# Patient Record
Sex: MALE | Race: WHITE | NOT HISPANIC OR LATINO
[De-identification: names, ages, dates, MRNs, and addresses within clinical notes are randomized per-mention and may not be internally consistent; named-entity substitution may affect disease eponyms.]

---

## 2016-12-14 NOTE — ED PROVIDER NOTE - ATTENDING CONTRIBUTION TO CARE
70yo male hx of CAD s/p CABG,ESRD on HD, chronic lymphedema, DM, HTN, HLD BIB EMS for cough x 1 week, productive w yellow/green sputum. +SOB w cough, pt had xray done in dialysis center showed L pleural effusion. Patent also reports chronic lymphedema b/l le and being treated with oral abx. On exam, Patient is awake,alert,oriented x 3. Patient's chest w decreased BS on Left, irregular +s1s2. Abdomen is soft nd/nt +BS. B/L LE with erythema, 1-2 + edema.  Patient noted to be in afib, has hx of it. Pt noted to be hypoxic placed on NRB, 4LNC at 90.   Check labs, Xray chest, B/L duplex, no hx dvt/pe. renal consult. possible pneumonia and cellulitis. re eval after labs.

## 2016-12-14 NOTE — ED ADULT NURSE NOTE - OBJECTIVE STATEMENT
Pt  BIBA for cough Pt productive cough with yellow mucous.  No fever Pt also is Dialysis pt Left av graft T th Sat Pt has chronic Lymphoedema and has scabbed wounds and an open wound lower left shin.  Pt states waiting for a wound specialists.  Pt uses a walker and has been having difficulty past couple of days Cornelio

## 2016-12-14 NOTE — ED PROVIDER NOTE - CONSTITUTIONAL, MLM
normal... Well appearing, well nourished, awake, alert, oriented to person, place, time/situation and in no apparent distress.

## 2016-12-14 NOTE — ED ADULT NURSE NOTE - PMH
AF (atrial fibrillation)    Anasarca    CAD (Coronary Artery Disease)    Cataract    CHF (congestive heart failure)    Chronic Kidney Disease (CKD)    Diabetes Mellitus Type II    Foot ulcer due to secondary DM  rt foot nacrotic foot ulcer  History of Prostate Cancer    Hyperlipemia    Hypertension    Moderate aortic stenosis    Pancreatitis    TA (Temporal Arteritis)

## 2016-12-14 NOTE — ED ADULT NURSE NOTE - PSH
AVF (Arteriovenous Fistula)    Coronary Stent  x 3 in 2008  History of Prostatectomy    Postpancreatectomy Hyperglycemia    S/P Cholecystectomy

## 2016-12-14 NOTE — ED PROVIDER NOTE - OBJECTIVE STATEMENT
72 YO M w/ Hx of CAD s/p CABG, Hx of HD, chronic lymphedema, DM, HTN, HLD p/w cough and bilateral LE infeciton. Pt is a poor historian, states he was initially sent to ED for worsening bilateral LE cellulitis and failed outpt Abx. Pt states his legs became more painful, felt his legs "give out" and sat on the toilet at home, EMS came to help pt. EMS states pt has been having worsening cough and congestion, sent to ED to r/o PNA.

## 2016-12-14 NOTE — ED ADULT NURSE REASSESSMENT NOTE - NS ED NURSE REASSESS COMMENT FT1
no distress noted, on NC with 6L, tolerating well, on CM with Afib. pending bed assignment, admitted to tele service.

## 2016-12-14 NOTE — ED PROVIDER NOTE - MEDICAL DECISION MAKING DETAILS
70 YO M p/w possible cough, weakness, possible PNA and possible worsening LE cellulitis but no signs of infection, unclear story for weakness, will contact wife, labs, ECG, CXR, UA, cardiac enzymes, and reassess.

## 2016-12-14 NOTE — ED PROVIDER NOTE - DIAGNOSIS COUNSELING, MDM
conducted a detailed discussion... I had a detailed discussion with the patient and/or guardian regarding the historical points, exam findings, and any diagnostic results supporting the discharge/admit diagnosis.

## 2016-12-14 NOTE — ED ADULT NURSE NOTE - NEURO WDL
Alert and oriented to person, place and time, memory intact, behavior appropriate to situation, PERRL.

## 2016-12-14 NOTE — ED PROVIDER NOTE - NS ED ATTENDING STATEMENT MOD
I have personally seen and examined this patient. I have fully participated in the care of this patient. I have reviewed all pertinent clinical information, including history physical exam, plan and the Resident's note and agree except as noted

## 2016-12-15 NOTE — H&P ADULT. - PROBLEM SELECTOR PLAN 4
Patient with Atrial fibrillation hx  -continue with Eliquis for a/c  -Continue with coreg  -continue to monitor on telemetry Patient with ESRD on HD  -nephrology consult appreciated: HD today  -continue with sevelamer, aranesp with HD

## 2016-12-15 NOTE — H&P ADULT. - PROBLEM SELECTOR PLAN 2
Patient with bilateral LE swelling and pain on exam, likely 2/2 volume overload  -DVT b/l LE show no evidence of DVT  -less likely concern for cellulitis on exam with L. sided erythrema present on exam, but no warmth, leukocytosis, fever,   -wound care consult (Pt of Dr. Brandyn Cotton's outpatient)

## 2016-12-15 NOTE — H&P ADULT. - PROBLEM SELECTOR PLAN 8
Patient with hx of prolonged 8 month hospitalization with cause of pancreatitis 2/2 stone (per patient)   -continue with Creon Patient reports humalog 6TID and no lantus at this time  -Pt with weight of about 100kg -> will do 10U lantus qAM and humalog 3U TID  -A1c ordered for AM  -CC, renal diet  -f/u FSG, ISS

## 2016-12-15 NOTE — H&P ADULT. - EKG AND INTERPRETATION
Personally reviewed & interpreted:  EKG: Atrial fibrillation with HR of 102bpm, right axis deviation, TWI vs biphasic waves seen in inferior leads (seen in 4/2016 EKG)

## 2016-12-15 NOTE — H&P ADULT. - EXTREMITIES COMMENTS
pt with venous stasis changes on bilateral LE, with erythrema present on LLE, with +1-2 edema bilaterally

## 2016-12-15 NOTE — H&P ADULT. - PROBLEM SELECTOR PLAN 1
Patient with volume overload on exam, found to have L. sided pleural effusion and edema bilaterally LLE on exam, likely causing dyspnea and hypoxia  -Nephrology consult appreciated: Pt to go for HD today  -continue with supplemental O2 as needed. After HD, will reassess patient, and at that time if continued hypoxia and dyspnea, may consider CT chest to r/o underlying PNA  -r/o ACS: no new EKG changes, f/u serial CE, continue with monitoring on telemetry Patient with volume overload on exam, found to have L. sided pleural effusion and edema bilaterally LLE on exam, likely causing dyspnea and hypoxia  -Nephrology consult appreciated: Pt to go for HD today  -CT chest to evaluate, r/o underlying PNA. Treat for HCAP at this time with vanc and zosyn  -continue with supplemental O2 as needed  -r/o ACS: no new EKG changes, f/u serial CE, continue with monitoring on telemetry

## 2016-12-15 NOTE — H&P ADULT. - PROBLEM SELECTOR PLAN 9
DVT ppx: Eliquis Patient with hx of prolonged 8 month hospitalization with cause of pancreatitis 2/2 stone (per patient)   -continue with Creon

## 2016-12-15 NOTE — H&P ADULT. - NEGATIVE NEUROLOGICAL SYMPTOMS
no paresthesias/no weakness/no syncope/no focal seizures/no tremors/no generalized seizures/no transient paralysis

## 2016-12-15 NOTE — H&P ADULT. - LAB RESULTS AND INTERPRETATION
Personally reviewed & interpreted:  CBC: No leukocytosis noted, Hg at 11.8 (baseline around 11)  ESR of 23 with CRP of 1.79  Cardiac enzymes: Troponin of 0.36 with elevated CKMB  with wnl CK  RVP negative

## 2016-12-15 NOTE — H&P ADULT. - NEGATIVE MUSCULOSKELETAL SYMPTOMS
no arthritis/no muscle weakness/no joint swelling/no stiffness/no myalgia/no arthralgia/no muscle cramps

## 2016-12-15 NOTE — H&P ADULT. - RADIOLOGY RESULTS AND INTERPRETATION
Personally reviewed & interpreted:  CXR: Pleural effusion to mid lung on L. side  DVT LE: No DVT noted

## 2016-12-15 NOTE — PROVIDER CONTACT NOTE (CHANGE IN STATUS NOTIFICATION) - ACTION/TREATMENT ORDERED:
Reassess BP in 15 minutes via ausculation MD will consider fluid bolus if pt remains hypotensive. RN will monitor

## 2016-12-15 NOTE — H&P ADULT. - ATTENDING COMMENTS
agree with excellent PGY-2 note with the following additions:  This is a 71 year old gentleman with hx of CAD s/p CABG, ESRD on HD (TThS), AFib on eloquis, insulin dependent type 2 DM, aortic and MV replacement,  CVA with L sided weaknes, HTN, HLD, chronic venous stasis presenting with cough, worsening lower extremity pain, redness. Regarding cough, he notes worsening cough x 1 week productive of brown sputum.  He denies SOB, has stable 2 pillow orthopnea, legs slightly more edema than previously, does not recall how much volume was taken off at last HD. Regarding legs, acute on chronic worsening with pus expressed this AM, has previously been treated with oral ABx. Denies fever, chills.  In the ED, -120/70s, HR of 80s, afebrile, initially hypoxic to 80s on RA, 90% on 4L, 100% on NRB. On exam, decreased breath sounds LLL, no crackles, bilateral 1-2+ non-pitting edema with L shin erythema and area of broken skin, no clear fluid collections, non-purulent. Labs notable for no leukocytosis, troponin of 0.36, RVP negative.  CXR with LLL effusion, no focal consolidation. Unclear etiology of LLL effusion, looks large on CXR. DDx includes CHF vs. parapneumonic effusion. Should get thora by pulm for symptoms relief and diagnostic value.  Will continue treatment for HAP (patient on HD) with vanc/zosyn/azithro for now pending thora. Legs look like stasis dermatitis, though vanc will also treat positive gram positive cellulitis. HD tomorrow, continue continuous pulse oximeter. agree with excellent PGY-2 note with the following additions:  This is a 71 year old gentleman with hx of CAD s/p CABG, ESRD on HD (TThS), AFib on eloquis, insulin dependent type 2 DM, aortic and MV replacement,  CVA with L sided weaknes, HTN, HLD, chronic venous stasis presenting with cough, worsening lower extremity pain, redness. Regarding cough, he notes worsening cough x 1 week productive of brown sputum.  He denies SOB, has stable 2 pillow orthopnea, legs slightly more edema than previously, does not recall how much volume was taken off at last HD. Regarding legs, acute on chronic worsening with pus expressed this AM, has previously been treated with oral ABx. Denies fever, chills.  In the ED, -120/70s, HR of 80s, afebrile, initially hypoxic to 80s on RA, 90% on 4L, 100% on NRB. On exam, decreased breath sounds LLL, no crackles, bilateral 1+ non-pitting edema with erythema, dry skin, healing scab on L anterior shin and abraded skin on anterior L ankle.  Legs are symmetric, cool to touch, no clear fluid collections, non-purulent. Labs notable for no leukocytosis, troponin of 0.36, RVP negative.  CXR with LLL effusion, no focal consolidation. Unclear etiology of LLL effusion, looks large on CXR. DDx includes CHF vs. parapneumonic effusion. Should get thora by pulm for symptoms relief and diagnostic value.  Will continue treatment for HAP (patient on HD) with vanc/zosyn/azithro for now pending thora. Legs look like stasis dermatitis, though vanc will also treat positive gram positive cellulitis. HD tomorrow, continue continuous pulse oximeter.

## 2016-12-15 NOTE — H&P ADULT. - PROBLEM SELECTOR PLAN 7
Patient reports humalog 6TID and no lantus at this time  -Pt with weight of about 100kg -> will do 10U lantus qAM and humalog 3U TID  -A1c ordered for AM  -CC, renal diet  -f/u FSG, ISS Pt with hx of CABG  -r/o ACS with workup as mentioned above  -continue ASA, coreg, unclear why pt not on statin (will need to f/u with PMD)

## 2016-12-15 NOTE — H&P ADULT. - NEGATIVE OPHTHALMOLOGIC SYMPTOMS
no blurred vision L/no lacrimation R/no lacrimation L/no discharge R/no photophobia/no discharge L/no pain L/no blurred vision R/no diplopia

## 2016-12-15 NOTE — H&P ADULT. - PROBLEM SELECTOR PLAN 5
Currently BP controlled  -continue with coreg Patient with Atrial fibrillation hx  -continue with Eliquis for a/c  -Continue with coreg  -continue to monitor on telemetry Patient with Atrial fibrillation hx  -continue with Eliquis for a/c - may need better a/c in setting of ESRD  -Continue with coreg  -continue to monitor on telemetry

## 2016-12-15 NOTE — H&P ADULT. - NEGATIVE GENERAL SYMPTOMS
no polyphagia/no polydipsia/no fever/no malaise/no polyuria/no chills/no sweating/no weight loss/no anorexia

## 2016-12-15 NOTE — H&P ADULT. - ASSESSMENT
70 yo M with hx of CAD s/p CABG, ESRD on HD, Atrial fibrillation, DM2, aortic and MV replacement, hx of CVA (~2014) with residual L. arm & leg weakness, HTN, HLD, presents with worsening L. LE swelling and pain with also worsening cough, found to have L. pleural effusion.

## 2016-12-15 NOTE — H&P ADULT. - HISTORY OF PRESENT ILLNESS
70 yo M with hx of CAD s/p CABG, ESRD on HD, DM2, HTN, HLD, presents with worsening LE swelling and pain. Patient reports today he felt very painful legs and felt leg swelling. He reports the leg swelling and redness has been present over the past six months, but today it was worse. He felt it had popped and white had come out. Patient had been on oral medications but he is unclear what the name of the antibiHe called his PMD, who told him to go the ED. Patient said he also come to the ED because of worsening cough over the past week. He reports a brownish sputum that has been coming up. He says he has no fever, chills, SOB, pleuritic pain, 72 yo M with hx of CAD s/p CABG, ESRD on HD, Atrial fibrillation, DM2, aortic and MV replacement, hx of CVA (~2014) with residual L. arm & leg weakness, HTN, HLD, presents with worsening L. LE swelling and pain. Patient reports today he felt very painful legs and felt leg swelling. He reports the leg swelling and redness has been present over the past six months, but today he felt the left side was worse. He felt something had popped on the L. side and white had come out. Patient had been on oral medications but he is unclear what the name of the antibiotics. He called his PMD, who told him to go the ED. Patient said he also come to the ED because of worsening cough over the past week. He reports a brownish sputum that has been coming up. He says he has no fever, chills, SOB, pleuritic pain.  Patient reports he lives at home with his wife and child. His wife mostly takes care of him, with an aide helping to help bathe him.     Patient in the ED, VS: BP: 118/72, HR of 82, T of 97.8F. Patient was found to be hypoxic with 90% on 4L NC, and placed on % O2. Patient given ceftriaxone and azithromycin.

## 2016-12-15 NOTE — H&P ADULT. - PROBLEM SELECTOR PLAN 3
Patient with ESRD on HD  -nephrology consult appreciated: HD today  -continue with sevelamer, aranesp with HD Patient with pleural effusion likely causing dyspnea  -F/u imaging after HD  -CT chest in the AM to evaluate to r/o PNA  -May consider thoracentesis as unclear why patient with one sided pleural effusion

## 2016-12-15 NOTE — H&P ADULT. - NEGATIVE GASTROINTESTINAL SYMPTOMS
no abdominal pain/no vomiting/no nausea/no change in bowel habits/no constipation/no flatulence/no diarrhea

## 2016-12-15 NOTE — H&P ADULT. - PROBLEM SELECTOR PLAN 6
Pt with hx of CABG  -r/o ACS with workup as mentioned above  -continue ASA, coreg, unclear why pt not on statin (will need to f/u with PMD) Currently BP controlled  -continue with coreg

## 2016-12-16 NOTE — PROVIDER CONTACT NOTE (OTHER) - ASSESSMENT
Pt coughed repeatedly while eating accompanied with mucous production.  02 sat remained wnl with supplemental oxygenation (2liters)

## 2016-12-21 NOTE — PHYSICAL THERAPY INITIAL EVALUATION ADULT - ADDITIONAL COMMENTS
contd from above: He reports the leg swelling and redness has been present over the past six months, but today he felt the left side was worse. He felt something had popped on the L. side and white had come out. Patient had been on oral medications but he is unclear what the name of the antibiotics. He called his PMD, who told him to go the ED. Patient said he also come to the ED because of worsening cough over the past week. He reports a brownish sputum that has been coming up. He says he has no fever, chills, SOB, pleuritic pain. Ct head: old infarct; Ct chest: mod L pleural effusion with complete collapse L lower lobr partial collapse left upper lobe    social history: pt lives with wife in private house, no stairs to negotiate, pt has HHA 3 days/week to assist with bathing. wife assists with ADLs, pt able to ambulate short household distances with rollator

## 2016-12-21 NOTE — PHYSICAL THERAPY INITIAL EVALUATION ADULT - ACTIVE RANGE OF MOTION EXAMINATION, REHAB EVAL
Right UE Active ROM was WFL (within functional limits)/B LE WFL AAROM; left shouldr NT pt declines due to pain

## 2016-12-21 NOTE — PHYSICAL THERAPY INITIAL EVALUATION ADULT - PERTINENT HX OF CURRENT PROBLEM, REHAB EVAL
70 yo M with hx of CAD s/p CABG, ESRD on HD, Atrial fibrillation, DM2, aortic and MV replacement, hx of CVA (~2014) with residual L. arm & leg weakness, HTN, HLD, presents with worsening L. LE swelling and pain. Patient reports today he felt very painful legs and felt leg swelling. contd below:

## 2016-12-21 NOTE — PHYSICAL THERAPY INITIAL EVALUATION ADULT - CRITERIA FOR SKILLED THERAPEUTIC INTERVENTIONS
anticipated equipment needs at discharge/therapy frequency/impairments found/anticipated discharge recommendation/predicted duration of therapy intervention

## 2016-12-22 NOTE — DIETITIAN INITIAL EVALUATION ADULT. - PROBLEM SELECTOR PLAN 4
Patient with ESRD on HD  -nephrology consult appreciated: HD today  -continue with sevelamer, aranesp with HD

## 2016-12-22 NOTE — DIETITIAN INITIAL EVALUATION ADULT. - OTHER INFO
Consult for nutrition assessment. Pt admitted for worsening LE swelling and pain. Per chart, Pt with acute respiratory failure w/ hypercapnea 2/2 volume overload and acute on chronic diastolic HF. Pt w/ Type 2 DM, h/o CAD s/p CABG, ESRD on HD. Pt reports good appetite in house- estimates consuming ~% at meals (consistent with flow sheet and RN). Pt reports UBW of 216 pounds- note current weight from 12/21 of 219 pounds. Pt reports checking finger sticks 2-3x/day at home with normal values between , sometimes ~150 towards the end of the day. No complaints of GI distress- Pt reports 3 BMs yesterday. No difficulty chewing or swallowing. No known food allergies confirmed. Consult for nutrition assessment. Pt admitted for worsening LE swelling and pain. Per chart, Pt with acute respiratory failure w/ hypercapnea 2/2 volume overload and acute on chronic diastolic HF. Pt w/ Type 2 DM, h/o CAD s/p CABG, ESRD on HD. Pt reports good appetite in house- estimates consuming ~% at meals (consistent with flow sheet and RN). Pt reports UBW of 216 pounds- note current weight from 12/21 of 219 pounds. Pt states he checks weights regularly at home, consistent at 216 pounds. Pt reports checking finger sticks 2-3x/day at home with normal values between , sometimes ~150 towards the end of the day. No complaints of GI distress- Pt reports 3 BMs yesterday. No difficulty chewing or swallowing. No known food allergies confirmed.

## 2016-12-22 NOTE — DIETITIAN INITIAL EVALUATION ADULT. - PROBLEM SELECTOR PLAN 3
Patient with pleural effusion likely causing dyspnea  -F/u imaging after HD  -CT chest in the AM to evaluate to r/o PNA  -May consider thoracentesis as unclear why patient with one sided pleural effusion

## 2016-12-22 NOTE — DIETITIAN INITIAL EVALUATION ADULT. - PROBLEM SELECTOR PLAN 7
Pt with hx of CABG  -r/o ACS with workup as mentioned above  -continue ASA, coreg, unclear why pt not on statin (will need to f/u with PMD)

## 2016-12-22 NOTE — DIETITIAN INITIAL EVALUATION ADULT. - ADHERENCE
fair/Pt follows a strict fluid restriction and watches his carbohydrate intake. Pt's wife states they try to monitor his potassium and sodium intake but was unfamiliar of sources of these nutrients. Typical intake: breakfast- oatmeal; lunch- turkey sandwich; dinner- chicken cutlet, turkey meatballs w/ pasta. Pt sips on ice cubes throughout the day and sometimes has tea.

## 2016-12-22 NOTE — DIETITIAN INITIAL EVALUATION ADULT. - PROBLEM SELECTOR PLAN 2
Patient with bilateral LE swelling and pain on exam, likely 2/2 volume overload  -DVT b/l LE show no evidence of DVT  -less likely concern for cellulitis on exam with L. sided erythrema present on exam, but no warmth, leukocytosis, fever,   -wound care consult (Pt of Dr. Barndyn Cotton's outpatient)

## 2016-12-22 NOTE — DIETITIAN INITIAL EVALUATION ADULT. - ORAL INTAKE PTA
Pt reports good appetite and intake PTA. Pt states he takes a vitamin for his diabetes but could not recall the name./good

## 2016-12-22 NOTE — DIETITIAN INITIAL EVALUATION ADULT. - PROBLEM SELECTOR PLAN 9
Patient with hx of prolonged 8 month hospitalization with cause of pancreatitis 2/2 stone (per patient)   -continue with Creon

## 2016-12-22 NOTE — DIETITIAN INITIAL EVALUATION ADULT. - NS AS NUTRI INTERV MEALS SNACK
1. Continue current diet order 2. Monitor PO intake, GI distress, weight, labs, and skin integrity 3. Provide food preferences within diet restrictions as feasible

## 2016-12-22 NOTE — DIETITIAN INITIAL EVALUATION ADULT. - DIET TYPE
consistent carbohydrate renal with no snacks/1000ml/DASH/TLC (sodium and cholesterol restricted diet)

## 2016-12-22 NOTE — DIETITIAN INITIAL EVALUATION ADULT. - NS AS NUTRI INTERV ED CONTENT
1. Provided diet education regarding renal restrictions. Discussed sources of potassium, phosphorus, and sodium in the diet and tips to monitor intake. 2. Discussed sodium and fluid restrictions & strategies to avoid excessive intake. 3. Provided CKD Stage 5 Tips for People on Dialysis & CKD Stage 5 Nutrition Therapy handouts from the [employer]. Pt & Pt's wife at bedside amenable to education and able to verbalize understanding, stating they will work on making these changes.

## 2016-12-22 NOTE — DIETITIAN INITIAL EVALUATION ADULT. - PERTINENT LABORATORY DATA
12/22 Na 134, BUN 52, Cr 5.82, Glucose 136, Phosphorus 7; 12/16 HgbA1c 6.6%. Finger sticks: 12/22: 120, 12/21:

## 2016-12-22 NOTE — DIETITIAN INITIAL EVALUATION ADULT. - ENERGY NEEDS
Ht: 74", Wt: 219 pounds, BMI: 28.1 kg/m2, IBW: 190 pounds (+/- 10%), IBW%: 115%. +1 edema left leg, +2 edema right leg. No pressure ulcers noted- skin excoriation to L knee, wound to L ankle.

## 2016-12-22 NOTE — DIETITIAN INITIAL EVALUATION ADULT. - PROBLEM SELECTOR PLAN 8
Patient reports humalog 6TID and no lantus at this time  -Pt with weight of about 100kg -> will do 10U lantus qAM and humalog 3U TID  -A1c ordered for AM  -CC, renal diet  -f/u FSG, ISS

## 2016-12-22 NOTE — DIETITIAN INITIAL EVALUATION ADULT. - PROBLEM SELECTOR PLAN 1
Patient with volume overload on exam, found to have L. sided pleural effusion and edema bilaterally LLE on exam, likely causing dyspnea and hypoxia  -Nephrology consult appreciated: Pt to go for HD today  -CT chest to evaluate, r/o underlying PNA. Treat for HCAP at this time with vanc and zosyn  -continue with supplemental O2 as needed  -r/o ACS: no new EKG changes, f/u serial CE, continue with monitoring on telemetry

## 2016-12-22 NOTE — STUDENT SIGN OFF DOCUMENT - DOCUMENTS STUDENTS ARE SIGNED OFF ON
Input and Output/Vital Signs/Plan of Care/Assessment and Intervention Patient Profile/Dietitian Initial Evaluation/Provider Contact Note

## 2016-12-26 NOTE — DISCHARGE NOTE ADULT - HOSPITAL COURSE
70 yo M with hx of CAD s/p CABG, ESRD on HD (TThS)c/b calciphylaxis, AFib on eloquis, insulin dependent type 2 DM, aortic and MV replacement, CVA with L sided weaknes, HTN, HLD, chronic venous stasis presenting with cough, worsening lower extremity pain, redness. Regarding cough, he notes worsening cough x 1 week productive of brown sputum.  He denied SOB, has stable 2 pillow orthopnea, legs slightly more edematous than previously, does not recall how much volume was taken off at last HD. He reports not missing dialysis. Regarding legs, acute on chronic worsening with pus expressed this AM, has previously been treated with oral ABx. Denied fever, chills.  In the ED, -120/70s, HR of 80s, afebrile, initially hypoxic to 80s on RA, 90% on 4L, 100% on NRB. On exam, decreased breath sounds LLL, no crackles, bilateral 1+ non-pitting edema with erythema, dry skin, healing scab on L anterior shin and abraded skin on anterior L ankle.  Legs were symmetric, cool to touch, no clear fluid collections, non-purulent, w/ chronic venous stasis changes. Labs notable for no leukocytosis, troponin of 0.36, RVP negative.  CXR with LLL effusion, no focal consolidation. Treated for HCAP with vanc/zosyn/azithromycin and admitted to medicine. Patient received dialysis next morning. Pulmonology was consulted and performed a thoracentesis showing a transudative effusion.  Patient found to be lethargic and course c/b CO2 retention. Patient placed on BIPAP w/ improvement in mental status and BIPAP continued overnight. CO2 retention thought to be possibly from underlying chronic lung disease or inappropriate respiratory drive from his previous CVA. Pt was continued on BIPAP at night, likely will need BIPAP indefinitely. Antibiotics were discontinued given effusion was transudative. Patient remained afebrile with normal white count. Vascular evaluated patient and recommended ABG. Patient will need to follow up with vascular service as an outpatient. Patient continued with dialysis three times a week. Midodrine was added to keep patient’s blood pressure up during dialysis to remove more fluid

## 2016-12-26 NOTE — DISCHARGE NOTE ADULT - HOME CARE AGENCY
[facility] [place] [id number] [phone]  A visiting nurse will visit you in your home within 48 hours of discharge.

## 2016-12-26 NOTE — DISCHARGE NOTE ADULT - CARE PROVIDER_API CALL
Omar Rico), Internal Medicine; Nephrology  [address] [place] [address]  Phone: [phone]  Fax: [phone]

## 2016-12-26 NOTE — DISCHARGE NOTE ADULT - PATIENT PORTAL LINK FT
“You can access the [employer] Patient Portal, offered by [facility], by registering with the following website: [email or web address]”

## 2016-12-26 NOTE — DISCHARGE NOTE ADULT - CARE PLAN
Principal Discharge DX:	Respiratory failure with hypoxia and hypercapnia, unspecified chronicity  Goal:	medically managed  Instructions for follow-up, activity and diet:	You were found to be hypoxic in the ED. An xray of the chest showed a large pleural effusion. Pulmonology tapped the effusion but you continued to be hypoxic and were found to retain CO2. You required BIPAP at night to decrease the CO2 content of your blood. Please continue to use BIPAP nightly.  Secondary Diagnosis:	Pleural effusion on left  Goal:	medically managed  Instructions for follow-up, activity and diet:	Continue to attend dialysis as scheduled.  Secondary Diagnosis:	Lymphedema of both lower extremities  Goal:	medically managed  Instructions for follow-up, activity and diet:	Please continue to use pain medication and gabapentin as prescribed for your lower extremity pain.  Secondary Diagnosis:	ESRD (end stage renal disease) on dialysis  Goal:	medically managed  Instructions for follow-up, activity and diet:	Please continue to attend dialysis as scheduled.  Secondary Diagnosis:	Chronic atrial fibrillation  Goal:	Medically managed  Instructions for follow-up, activity and diet:	Please continue to take xarelto as prescribed.  Secondary Diagnosis:	Diabetes mellitus type 2, insulin dependent  Goal:	medically managed  Instructions for follow-up, activity and diet:	Please continue to use insulin as prescribed.  Secondary Diagnosis:	Chronic pancreatitis, unspecified pancreatitis type  Goal:	medically managed  Instructions for follow-up, activity and diet:	Please continue to take creon as prescribed.

## 2016-12-26 NOTE — DISCHARGE NOTE ADULT - SECONDARY DIAGNOSIS.
Pleural effusion on left Lymphedema of both lower extremities ESRD (end stage renal disease) on dialysis Chronic atrial fibrillation Diabetes mellitus type 2, insulin dependent Chronic pancreatitis, unspecified pancreatitis type

## 2016-12-26 NOTE — DISCHARGE NOTE ADULT - PLAN OF CARE
medically managed You were found to be hypoxic in the ED. An xray of the chest showed a large pleural effusion. Pulmonology tapped the effusion but you continued to be hypoxic and were found to retain CO2. You required BIPAP at night to decrease the CO2 content of your blood. Please continue to use BIPAP nightly. Continue to attend dialysis as scheduled. Please continue to use pain medication and gabapentin as prescribed for your lower extremity pain. Please continue to attend dialysis as scheduled. Medically managed Please continue to take xarelto as prescribed. Please continue to use insulin as prescribed. Please continue to take creon as prescribed.

## 2016-12-27 NOTE — PROVIDER CONTACT NOTE (OTHER) - ASSESSMENT
Pt denies pain in heel area. Pt was sitting in recliner since late afternoon as per day RN. VSS. /71, RR 19, T 97.5, HR 92, o2 95%.

## 2016-12-27 NOTE — PROVIDER CONTACT NOTE (OTHER) - RECOMMENDATIONS
Apply cavillon film to area, and ensure elevation of b/l heels. Continue turning & positioning q2hrs. Continue to monitor for pain. Add wound consult for further recommendations.

## 2016-12-28 NOTE — PROVIDER CONTACT NOTE (OTHER) - ASSESSMENT
A&Ox4, agitated about wearing BIPAP. Patient placed on 3L NC to maintain SpO2 above 92%. Patient educated on need for BIPAP overnight.

## 2016-12-29 NOTE — PROVIDER CONTACT NOTE (OTHER) - ASSESSMENT
Patient agitated and is taking BIPAP off. Uncooperative. Patient educated on need for BIPAP. Patient is alert and verbal.

## 2017-01-03 ENCOUNTER — RESULT REVIEW (OUTPATIENT)
Age: 72
End: 2017-01-03

## 2017-01-05 NOTE — PROVIDER CONTACT NOTE (OTHER) - ACTION/TREATMENT ORDERED:
Put pt back on bipap. Settings for bipap is changed to 12/5. Will recheck ABG in an hour. Will continue to monitor

## 2017-01-17 ENCOUNTER — RESULT REVIEW (OUTPATIENT)
Age: 72
End: 2017-01-17

## 2017-01-20 NOTE — PROVIDER CONTACT NOTE (OTHER) - ACTION/TREATMENT ORDERED:
Ordering stat ecg and labs including abg. Will follow up with cardiology about possible emergent cath

## 2017-01-20 NOTE — PROVIDER CONTACT NOTE (OTHER) - ACTION/TREATMENT ORDERED:
Will clarify and follow up on chest tube, possibly too much fluid loss. Clamp temporarily. Will clarify and follow up on chest tube, possibly too much fluid loss. Clamp temporarily. Ordered Plavix for patient.

## 2017-01-20 NOTE — PROVIDER CONTACT NOTE (CHANGE IN STATUS NOTIFICATION) - BACKGROUND
Pt returned from dialysis, admitted with lower extremity edema and pleural effusion
s/p cath
s/p cath

## 2017-01-20 NOTE — PROVIDER CONTACT NOTE (OTHER) - ASSESSMENT
Patient is still complaining of chest pain. VS 92/54 hr95, rr18 95% on 4L NC. Patient complaining of pain. Chest tube has been changed with almost 600cc drained.

## 2017-01-20 NOTE — PROVIDER CONTACT NOTE (OTHER) - ASSESSMENT
Patient VS stable 95/55 pulse 109, 79.6, rr18, 96% on 3L NC patient cardiac enzymes trending up 4.78. No visible tachypnea Patient chest tube order also unclear if the tube should be unclamped. Patient lost 300cc, unclear as to how long since the loss. Could be related to pain patient is having.

## 2017-01-20 NOTE — PROVIDER CONTACT NOTE (CHANGE IN STATUS NOTIFICATION) - ASSESSMENT
lethargic
Pt denies light headedness and dizziness, pulse 92 respiration 18, o2 sat 97% on RA
lethargic, requiring 100% NRB

## 2017-01-20 NOTE — PROVIDER CONTACT NOTE (OTHER) - RECOMMENDATIONS
Do you want the patient to be clamped? Possible chest x-ray for the chest pain/troponin. Follow up with AM labs

## 2017-01-20 NOTE — PROVIDER CONTACT NOTE (OTHER) - ASSESSMENT
Patient VS stable, not complaining of any pain currently. However, patient orders still unclear if chest tube should be clamped. In provider handoff there is a note written to keep unclamped overnight. That is not a reliable order.

## 2017-01-28 NOTE — PROVIDER CONTACT NOTE (CRITICAL VALUE NOTIFICATION) - NAME OF MD/NP/PA/DO NOTIFIED:
DARWIN Quevedo
DR Lainez
Dr Ballesteros
Dr Ballesteros
Dr. Castro
LESLIE Vieyra NP
RADHA MUÑOZ)
Team 2

## 2017-01-28 NOTE — PROVIDER CONTACT NOTE (CRITICAL VALUE NOTIFICATION) - ASSESSMENT
pt resting comfortably, pt on bipap, s/p stents
No distress signs noted.
Patient VS are stable, complaining of chest pain. MD at bedside.
no s/s of bleeding
Patient VS stable, oxygen saturation within defined limits. ABG results critical of pO2 of 45.
Patient resting comfortably.  O2 saturation 99% on current BiPAP settings.
Pt. currently @ dialysis
pt is alert and oriented, no sign of distress noted, SaO2 93% while on 2-3L NC

## 2017-01-28 NOTE — PROVIDER CONTACT NOTE (CRITICAL VALUE NOTIFICATION) - BACKGROUND
pt admitted to CCu
Patient is admitted for Resp Failure 2/2 pleural effusion
dx LE swelling
Patient admitted for acute resp failure 2/2 to pleural effusion
Patient in CCU on BiPAP for respiratory distress and elevated CO2
admitting diagnosis dyspnea
pt refused bipap over night, requested don NC because it was unconfortable.

## 2017-01-28 NOTE — PROVIDER CONTACT NOTE (CRITICAL VALUE NOTIFICATION) - SITUATION
PTT >200
ABG daily f/u pH 7.20 checked this AM. No distress signs noted. But Pt. is drowsy.
Aptt >200
blood gas arteriol PH 7.19 while on 2-3L NC
pH from mixed venous blood gas 7.2
pO2 of 45
pO2 50

## 2017-01-28 NOTE — PROVIDER CONTACT NOTE (CRITICAL VALUE NOTIFICATION) - ACTION/TREATMENT ORDERED:
PA made aware. Will continue monitoring.
re applied Bipap. continue to monitor
follow nomogram
Awaiting ABG results.
Bipap STAT will continue to further assess
Call RRT, possible intubation
Call dialysis RN Aislinn Francisco, dialysis RN made aware. Follow heparin nomogram.
place patient on bipap until lunch time 113

## 2017-01-28 NOTE — PROVIDER CONTACT NOTE (CRITICAL VALUE NOTIFICATION) - RECOMMENDATIONS
Will continue monitoring.
follow nomogram
Awaiting ABG results.
Continue to follow up with labs, bipap necessary
MD at bedside call RRT
Made NP aware.
place patient on bipap until lunch time 1132

## 2017-01-30 NOTE — ADVANCED PRACTICE NURSE CONSULT - ASSESSMENT
Pts wife at bedside; became tearful when discussing her husbands' condition and expressing concern regarding 's prolonged hospitalization - reassurance and emotional support provided. Wife also concerned about wound on patients nose. Upon assessment, the pt presents with a dry scab on the bridge of the nose measuring 0.9cm x 0.5cm x0cm- the scab is dry, there is no drainage, the periwound skin is intact with no erythema, induration or fluctuance. Cavilon liquid barrier film was applied to lay down a protective barrier on the skin. Explained to the pts wife that the scab is acting like a band-aid to protect the skin. Pt's reliance on BiPAP has decreased of late and relief from the mask against the skin will also help the wound to heal. Protocol for BiPAP use includes  a foam to be applied to the bridge of the nose to prevent or reduce disruption of the skin integrity.   Pt presents with generalized edema with intertrigo noted in the folds of the groin, in the right groin the skin has peeled and an area of denudement is noted. staff have been applying barrier cream- would recommend Cavilon at this time due to increased moisture in this area.  The b/l heels present with evolving deep tissue injuries- they appear to be resolving at this time as they are decreasing in size. staff have applied z-angelique boots for off-loading. there is b/l edema of the extremities making it difficult to palpate the pedal pulses. the toenails are thick with a fungal appearance.  Suggest that there may be a vascular component to  this disruption in skin integrity- plan is for pt to f/u with Vascular upon discharge.  the right heel measures 2cm x 1cm x0cm, the left heel measures 1cm x 1.2cm x 0cm. they present in a similar fashion as intact dark maroon discoloration of the skin- it is non-blanchable, there is no drainage and the periwound skin is intact.  will recommend to continue to monitor for changes, apply Cavilon daily and to continue with z-angelique boots.  The pt has been followed by nutrition. A low air-loss surface is in use and the pt is being turned and positioned as per  review of the nursing documentation.  discussed with RN and NM- recommended social work consult to assist with emotional needs.

## 2017-01-30 NOTE — ADVANCED PRACTICE NURSE CONSULT - REASON FOR CONSULT
Requested by staff and pts family to assess skin status on the bridge of the nose. PMH is noted:  70 yo M with hx of CAD s/p CABG, ESRD on HD, Atrial fibrillation, DM2, aortic and MV replacement, hx of CVA (~2014) with residual L. arm & leg weakness, HTN, HLD, presents with worsening L. LE swelling and pain. Patient reports today he felt very painful legs and felt leg swelling. He reports the leg swelling and redness has been present over the past six months, but today he felt the left side was worse. He felt something had popped on the L. side and white had come out. Patient had been on oral medications but he is unclear what the name of the antibiotics. He called his PMD, who told him to go the ED. Patient said he also come to the ED because of worsening cough over the past week. He reports a brownish sputum that has been coming up. He says he has no fever, chills, SOB, pleuritic pain.  Patient reports he lives at home with his wife and child. His wife mostly takes care of him, with an aide helping to help bathe him.     Patient in the ED, VS: BP: 118/72, HR of 82, T of 97.8F. Patient was found to be hypoxic with 90% on 4L NC, and placed on % O2. Patient given ceftriaxone and azithromycin.  Past Medical History:  AF (atrial fibrillation)    Anasarca    CAD (Coronary Artery Disease)    Cataract    CHF (congestive heart failure)    Chronic Kidney Disease (CKD)    Diabetes Mellitus Type II    Foot ulcer due to secondary DM  rt foot nacrotic foot ulcer  History of Prostate Cancer    Hyperlipemia    Hypertension    Moderate aortic stenosis    Pancreatitis    TA (Temporal Arteritis).    Past Surgical History:  AVF (Arteriovenous Fistula)    Coronary Stent  x 3 in 2008  History of Prostatectomy    Postpancreatectomy Hyperglycemia    S/P Cholecystectomy. Requested by staff and pts family to assess skin status on the bridge of the nose. PMH is noted:  72 yo M with hx of CAD s/p CABG, ESRD on HD, Atrial fibrillation, DM2, aortic and MV replacement, hx of CVA (~2014) with residual L. arm & leg weakness, HTN, HLD, presents with worsening L. LE swelling and pain. Patient reports today he felt very painful legs and felt leg swelling. He reports the leg swelling and redness has been present over the past six months, but today he felt the left side was worse. He felt something had popped on the L. side and white had come out. Patient had been on oral medications but he is unclear what the name of the antibiotics. He called his PMD, who told him to go the ED. Patient said he also come to the ED because of worsening cough over the past week. He reports a brownish sputum that has been coming up. He says he has no fever, chills, SOB, pleuritic pain.  Patient reports he lives at home with his wife and child. His wife mostly takes care of him, with an aide helping to help bathe him.     Patient in the ED, VS: BP: 118/72, HR of 82, T of 97.8F. Patient was found to be hypoxic with 90% on 4L NC, and placed on % O2. Patient given ceftriaxone and azithromycin.  Past Medical History:  AF (atrial fibrillation)    Anasarca    CAD (Coronary Artery Disease)    Cataract    CHF (congestive heart failure)    Chronic Kidney Disease (CKD)    Diabetes Mellitus Type II    Foot ulcer due to secondary DM  rt foot nacrotic foot ulcer  History of Prostate Cancer    Hyperlipemia    Hypertension    Moderate aortic stenosis    Pancreatitis    TA (Temporal Arteritis).    Past Surgical History:  AVF (Arteriovenous Fistula)    Coronary Stent  x 3 in 2008  History of Prostatectomy    Postpancreatectomy Hyperglycemia    S/P Cholecystectomy.    Pt with prolonged hospital course and multiple complications including thoracentesis with recurring pleural effusions- family declining pig-tail catheter. Pt has a chest tube is place.  Respiratory issues requiring prolonged use of BiPAP therapy due to retention of CO2, RRT for respiratory distress and transfer to MICU, + troponin and transfer to CCU for PCI-1/20.  Hemodynamic instability requiring pressor support for hypotension.  Pt transferred to [facility] on 1/27.

## 2017-01-31 NOTE — SWALLOW BEDSIDE ASSESSMENT ADULT - PHARYNGEAL PHASE
Wet vocal quality post oral intake/Throat clear post oral intake/Delayed pharyngeal swallow/Decreased laryngeal elevation inconsistent/Delayed pharyngeal swallow/Wet vocal quality post oral intake/Decreased laryngeal elevation

## 2017-01-31 NOTE — SWALLOW BEDSIDE ASSESSMENT ADULT - ASR SWALLOW REFERRAL
Consider GI consult given hospital course significant for nausea/vomiting after meals and ?esophageal stricture. Consider ENT consult given dysphonia.

## 2017-01-31 NOTE — SWALLOW BEDSIDE ASSESSMENT ADULT - COMMENTS
As per conversation with NP Omaira re: dysphagia on 1/30/17. MD Rock not aware of emesis/nausea post meals and ? h/o achalasia as per chart review. Per NP, pt cleared for bedside swallow assessment for mechanical soft texture from a GI standpoint. NP to discuss with MD Rock re: GI consult to assess above. This service to f/u tomorrow for swallow assessment. As per conversation with NP Omaira re: dysphagia on 1/30/17. MD Rock not aware of emesis/nausea post meals and ? h/o achalasia as per chart review. Per NP, pt cleared for bedside swallow assessment for mechanical soft texture from a GI standpoint. NP to discuss with MD Rock re: GI consult to assess above. This service to f/u tomorrow for swallow assessment.    ***Re swallow history: This clinician discussed with outpatient ENT MD Cristobal. Pt had FEES 12/2015 given c/o pharyngeal stasis with food/liquid. FEES revealed dysphagia, aspiration, pharyngeal residue, laryngeal edema, reflux. Rx: 1.) Regular texture diet with chin tuck for all PO intake, alternating solid and liquid to aid in reducing pharyngeal residue, 2.) reflux precautions, 3.) aspiration precautions, 4.) pulmonary consult and 5.) repeat FEES. Stroboscopy performed which revealed anterior glottal gap. Bx of distal esophagus also performed which was normal.

## 2017-01-31 NOTE — SWALLOW BEDSIDE ASSESSMENT ADULT - SWALLOW EVAL: PATIENT/FAMILY GOALS STATEMENT
Swallow history obtained from pt/spouse. History limited as pt/spouse unable to recall details of dysphagia. Pt reports receiving a "swallow test" with ENT MD Cristobal in January of last year which revealed "GERD" and with recommendation to "lean forward" for all drinks and "eat slow." Otherwise, pt/spouse report "everything was fine." They were unsure of rationale for swallow assessment and stated he was referred to an ENT due to hearing. Pt reports that he continues to "lean forward" for intake of liquids. Given unreliability of dysphagia history, pt provided clinician with ENT information for this service to follow up. Pt denied to this service further esophageal dysphagia other than GERD, denies ? esophageal stricture.

## 2017-01-31 NOTE — SWALLOW BEDSIDE ASSESSMENT ADULT - SLP GENERAL OBSERVATIONS
Pt received in bed. 3L NC in place. + Chest tube. Grossly A&Ox3 (to self and date, reporting [facility] ->self corrected). Pt able to follow commands for evaluation. Able to make wants/needs known. Pt's spouse at the bedside. Extensive discussion had with pt's spouse re: rationale for swallow evalaution, swallow evaluation findings and recommendations and rationale for the same including rx for NPO and further objective assessment. Spouse had numerous questions re: risks of MBS and FEES exams, details re: each procedure, possible outcomes of swallow assessment, risks of aspiration. All questions answered. Pt's spouse overall appeared anxious and expressed upset re: pt's hospital course and unfamiliarity with MDs. Pt's spouse appeared upset re: swallow evaluation findings and recommendations and is unsure of whether she wishes to proceed with MBS or FEES or any objective assessment at this time. NP Valery and nurse manager Shankar made aware of the above.

## 2017-01-31 NOTE — SWALLOW BEDSIDE ASSESSMENT ADULT - SWALLOW EVAL: DIAGNOSIS
Consult for bedside swallow evaluation received and apprecaited. Chart reviewed and case d/w NP: Starla who reported dysphagia w/u is not clinically indicated at present time. Given above, order for evaluation to be discontinued and this service will no longer actively follow. Please reconsult pending change in status.
This service consulted for bedside swallow evaluation. Case discussed with NP Gloria. Per NP, prior to bedside swallow evaluation NP to f/u with MD Rock re: ? achalasia and etiology of nausea/vomiting after meals. Consider GI consult. This service to follow up pending GI findings.
Pt presents with an oral and suspected pharyngeal dysphagia characterized by mildly reduced A-P transport, delay in trigger of the pharyngeal swallow, reduced hyolaryngeal elevation upon palpation, and overt s/s laryngeal penetration/aspiration with most conservative textures (change in vocal quality, throat clear). Vocal quality dysphonic.

## 2017-01-31 NOTE — SWALLOW BEDSIDE ASSESSMENT ADULT - ASR SWALLOW RECOMMEND DIAG
This service recommends MBS v FEES to objectively assess the oropharyngeal swallow mechanism and determine least restrictive diet. Defer to MD and pt/family wishes re: objective testing. Defer to MD re: safety of intake of barium given ? esophageal stricture.

## 2017-02-03 NOTE — ADVANCED PRACTICE NURSE CONSULT - RECOMMEDATIONS
Will recommend the followin. Right groin: daily hygiene and apply Cavilon to the periwound skin- allow to dry. Apply an Aquacel hydrofiber to the wound- cover with gauze - secure with paper tape when ambulating. change daily and prn for drainage/soiling  2. Left groin: Daily hygiene and apply Interdry Ag to the groin and abdominal fold. Cleanse skin and re-apply daily  3. routine pericare with Hortensia  4. Sween 24 cream to moisturize dry skin daily  5. Continue with turning and positioning  6. Nutrition support as pt condition allows.  Tx plan discussed with RN- remain available as requested by staff

## 2017-02-03 NOTE — ADVANCED PRACTICE NURSE CONSULT - ASSESSMENT
The pt remains on a [employer] P 500 support surface for low air-loss and pressure redistribution features. As per review of the nursing documentation, the pt is being assisted with turning and positioning, a z-angelique cushion is at the bedside to assist with turning. The pts heels are being off-loaded with z-angelique boots- as per review of the  medical record, the pt is being followed by Podiatry for his foot wounds.   A seat cushion is in the chair for off-loading when pt gets OOB to the chair.  Pts wife and daughter were at the bedside and visualized the pts skin. Pt reports that the wound in the groin is painful and he was not able to participate in PT . In the right groin is a superficial wound - essentially unchanged since last assessment. the pts skin is moist and peeling and this is noted in the right groin, lower abdomen, and scrotum as well.  there is moderate serous/serosanguinous drainage as pt has generalized edema- there is skin to skin contact in the groin with fragile skin. Last treatment was Cavilon - due to increased drainage, will recommend to d/c Cavilon and apply an Aquacel hydrofiber dressing to wick the drainage- in the left groin the skin is erythematous, will recommend the application of Interdry to wick moisture- staff had been applying Nystatin powder- will recommend to d/c powder  at this time.  Pts daughter expressing concern about the "the skin on his back"- upon assessment, the pt was incontinent of soft pasty stool and pericare was provided- pt has skin changes consistent with IAD and Hortensia moisture barrier cream was applied.  The abrasion on the nose remains unchanged.  Nutrition continues to follow the patient at this time.  Education was provided to the pt/wife/daughter regarding care of the skin and products used. Discussed tx plan with the primary RN. Pt reported increased comfort with Aquacel dressing to the right groin.

## 2017-02-03 NOTE — ADVANCED PRACTICE NURSE CONSULT - REASON FOR CONSULT
Requested by staff and pts family to re-evaluate skin status: right groin. PMH is noted:    72 yo M with hx of CAD s/p CABG, ESRD on HD, Atrial fibrillation, DM2, aortic and MV replacement, hx of CVA (~2014) with residual L. arm & leg weakness, HTN, HLD, presents with worsening L. LE swelling and pain. Patient reports today he felt very painful legs and felt leg swelling. He reports the leg swelling and redness has been present over the past six months, but today he felt the left side was worse. He felt something had popped on the L. side and white had come out. Patient had been on oral medications but he is unclear what the name of the antibiotics. He called his PMD, who told him to go the ED. Patient said he also come to the ED because of worsening cough over the past week. He reports a brownish sputum that has been coming up. He says he has no fever, chills, SOB, pleuritic pain.  Patient reports he lives at home with his wife and child. His wife mostly takes care of him, with an aide helping to help bathe him.     Patient in the ED, VS: BP: 118/72, HR of 82, T of 97.8F. Patient was found to be hypoxic with 90% on 4L NC, and placed on % O2. Patient given ceftriaxone and azithromycin.  Past Medical History:  AF (atrial fibrillation)    Anasarca    CAD (Coronary Artery Disease)    Cataract    CHF (congestive heart failure)    Chronic Kidney Disease (CKD)    Diabetes Mellitus Type II    Foot ulcer due to secondary DM  rt foot nacrotic foot ulcer  History of Prostate Cancer    Hyperlipemia    Hypertension    Moderate aortic stenosis    Pancreatitis    TA (Temporal Arteritis).    Past Surgical History:  AVF (Arteriovenous Fistula)    Coronary Stent  x 3 in 2008  History of Prostatectomy    Postpancreatectomy Hyperglycemia    S/P Cholecystectomy.    Pt with prolonged hospital course and multiple complications including thoracentesis with recurring pleural effusions- family declining pig-tail catheter. Pt has a chest tube is place.  Respiratory issues requiring prolonged use of BiPAP therapy due to retention of CO2, RRT for respiratory distress and transfer to MICU, + troponin and transfer to CCU for PCI-1/20.  Hemodynamic instability requiring pressor support for hypotension.  Pt transferred to [facility] on 1/27.  The pt was last seen by the Wound Care Team on 1/30  Requested by staff and pts family to assess skin status on the bridge of the nose. PMH is noted:  72 yo M with hx of CAD s/p CABG, ESRD on HD, Atrial fibrillation, DM2, aortic and MV replacement, hx of CVA (~2014) with residual L. arm & leg weakness, HTN, HLD, presents with worsening L. LE swelling and pain. Patient reports today he felt very painful legs and felt leg swelling. He reports the leg swelling and redness has been present over the past six months, but today he felt the left side was worse. He felt something had popped on the L. side and white had come out. Patient had been on oral medications but he is unclear what the name of the antibiotics. He called his PMD, who told him to go the ED. Patient said he also come to the ED because of worsening cough over the past week. He reports a brownish sputum that has been coming up. He says he has no fever, chills, SOB, pleuritic pain.  Patient reports he lives at home with his wife and child. His wife mostly takes care of him, with an aide helping to help bathe him.     Patient in the ED, VS: BP: 118/72, HR of 82, T of 97.8F. Patient was found to be hypoxic with 90% on 4L NC, and placed on % O2. Patient given ceftriaxone and azithromycin.  Past Medical History:  AF (atrial fibrillation)    Anasarca    CAD (Coronary Artery Disease)    Cataract    CHF (congestive heart failure)    Chronic Kidney Disease (CKD)    Diabetes Mellitus Type II    Foot ulcer due to secondary DM  rt foot nacrotic foot ulcer  History of Prostate Cancer    Hyperlipemia    Hypertension    Moderate aortic stenosis    Pancreatitis    TA (Temporal Arteritis).    Past Surgical History:  AVF (Arteriovenous Fistula)    Coronary Stent  x 3 in 2008  History of Prostatectomy    Postpancreatectomy Hyperglycemia    S/P Cholecystectomy.

## 2017-02-05 NOTE — PROVIDER CONTACT NOTE (MEDICATION) - ACTION/TREATMENT ORDERED:
MD made aware, continue to monitor, educate patient on importance of medication.
MD contacted and aware. Will continue to monitor.

## 2017-02-05 NOTE — PROVIDER CONTACT NOTE (MEDICATION) - ASSESSMENT
Pt A&Ox4. Pt stating he is having difficulty swallowing medications and refuses to take any medications by mouth until he has a speech and swallow. Offered to crush medications and give with applesauce, pt still refused. Pt educated on the importance of his medication.
VSS

## 2017-02-05 NOTE — PROVIDER CONTACT NOTE (OTHER) - ASSESSMENT
Patient A&O 3 to 4; forgetful at times. V/S: temp 97.6; HR 94; BP 97/55 RR 18 and SPO2 95% on 2L humidified O2. Patient A&O 3 to 4; forgetful at times. V/S: temp 97.6; HR 94; BP 97/55 RR 18 and SPO2 95% on 2L humidified O2. Fingerstick 67.

## 2017-02-05 NOTE — PROVIDER CONTACT NOTE (MEDICATION) - REASON
Patient has poor appetite and refusing medications Patient has poor appetite and refusing PO  medications

## 2017-02-05 NOTE — PROVIDER CONTACT NOTE (MEDICATION) - BACKGROUND
Patient admitted on 12/14/2016 for acute hypoxic respiratory failure secondary to insufficient HD. Pt has left sided chest tube.
Pt admitted for acute hypoxic respiratory failure 2/2 insufficient HD and pleural effusion. S/p chest tube.

## 2017-02-05 NOTE — PROVIDER CONTACT NOTE (OTHER) - ACTION/TREATMENT ORDERED:
MD contacted and aware. Will continue to monitor. MD contacted and aware. Dextrose 50% injectable ordered. Will continue to monitor.

## 2017-02-05 NOTE — PROVIDER CONTACT NOTE (OTHER) - ASSESSMENT
Chest tube patent intact and WDL. V/S temp 97.3 HR 98 BP 95/47 RR 18 & SPO2 98% on 2L NC. Patient denies chest pain and SOB. Chest tube assessed by Tisha Molina NP 13:30.

## 2017-02-06 NOTE — PROVIDER CONTACT NOTE (OTHER) - SITUATION
Pt lethargic and rhonci in all lungs fields upon auscultation
BP 97/66
Patient chest tube order is still unclear, need specific order
Patient is still complaining of chest pain
ABG drawn; pH 7.22. pCO2 59 pO2 130
PT ON BIPP FROM 2200-0030  REFUSED TO PUT IT ON FROM 1230 AM  NO RESPIRATORY DISTRESS NOTED
Patient agitated and is taking off BIPAP.
Patient appears to have a DTI on R heel
Patient chest tube orders, following up on trending troponins
Patient has left sided chest tube to water seal. Patient chest tube drained 10 cc since 7am
Patient has morning labs to be drawn.  Patient is requesting blood work to be drawn during HD.
Patient is refusing to wear bipap at night. Patient is complaining of constipation and wants something stronger than his usual bowel regimen
Patient repeat fingerstick 67
Patient took off BIPAP on his own and is refusing to keep it on.
Patient's PTT is below the lower limit set by the [facility]
Pt with marked coughing when eating
hypoglycemia

## 2017-02-06 NOTE — PROVIDER CONTACT NOTE (OTHER) - REASON
ABG drawn; pH 7.22. pCO2 59 pO2 130
Patient appears to have a DTI on R heel
Patient chest tube drained 10 cc since 7am
Patient chest tube orders, following up on trending troponins
Patient requesting AM blood work to be drawn during HD
Pt lethargic and rhonci in all lungs fields upon auscultation
Repeat Fingerstick 67
hypoglycemia
patient with marked coughing when eating;
pt refused BIPAP
Patient BP 97/66
Patient chest tube order is still unclear, need specific order
Patient is refusing bipap. Patient is complaining of constipation and wants something stronger than senna/colace
Patient is still complaining of chest pain
Patient refusing BIPAP
Patient took off BIPAP
Patient's PTT is below the lower limit set by the [facility]

## 2017-02-06 NOTE — PROVIDER CONTACT NOTE (OTHER) - ASSESSMENT
Pt A&Ox4 however lethargic, VS: BP - 97/57, temp - 98.2 orally, HR = 93, O2 = 96% on 4L and RR - 20. Pt is complaining of difficulty breathing.

## 2017-02-06 NOTE — PROVIDER CONTACT NOTE (OTHER) - NAME OF MD/NP/PA/DO NOTIFIED:
Dr. Giraldo
Ya Arriaza MD
ASHLEY PERALTA
Dr Ballesteros
Dr Bhat team 2
Dr. Giraldo
Dr. Haritha Granados (team 60
[facility]
Jemma GALARZA, Team 2
MD Ya Arriaza
MD Ya Arriaza
paged [phone] (awaiting call ball)
Edward Alberto MD
Dr. Avalos
Dr Ballesteros

## 2017-02-06 NOTE — PROVIDER CONTACT NOTE (OTHER) - DATE AND TIME:
02-Feb-2017 23:07
29-Dec-2016 03:45
01-Jan-2017 01:00
03-Feb-2017 23:22
05-Feb-2017 08:25
05-Feb-2017 17:30
05-Jan-2017 06:30
16-Dec-2016 20:24
19-Jan-2017 21:30
21-Dec-2016 06:25
26-Dec-2016 23:10
27-Dec-2016 13:14
28-Dec-2016 01:05
06-Feb-2017 20:15
20-Jan-2017 02:50
20-Jan-2017 01:15

## 2017-02-06 NOTE — PROVIDER CONTACT NOTE (OTHER) - BACKGROUND
Patient was admitted for acute hypoxic resp failure 2/2 pleural effusion
Patient was admitted for acute hypoxic resp failure 2/2 pleural effusion
Patient admitted with dyspnea.
ADMITTED WITH DYSPNEA  ON HEMODIALYSIS
Admitted with left pleural effusion.  s/p thoracentesis today
Patient admitted on 12/14/2016 for acute hypoxic respiratory failure secondary to insufficient HD. Patient has Type 2 DM.
Patient admitted on 12/14/2017 for acute hypoxic respiratory failure secondary to insufficient HD.
Patient admitted to unit with dyspnea due to pleural effusion
Patient admitted to unit with dyspnea secondary to a pleural effusion. Patient is on a heparin gtt running at 4 ml/hr
Patient admitted with dyspnea
Patient was admitted for acute hypoxic resp failure 2/2 pleural effusion
Pt admitted for acute hypoxic resp failure 2/2 insufficient HD/ L pleur eff. B/L Z boots in place for heel offloading.
Pt admitted with acute hypoxic respiratory falure 2/2 insufficient HD and pleural effusion with left sided chest tube currently clamped, history of ESRD on dialysis, Afib on heparin gtt
Pt admitted with dyspnea
Pt admitted with dyspnea.
Pt came in for dyspnea. Pt was refusing bipap overnight. Respiratory therapist came to change the mask. Pt tolerated the bipap for 5 hours.
dx respiratory failure

## 2017-02-07 NOTE — DISCHARGE NOTE FOR THE EXPIRED PATIENT - HOSPITAL COURSE
70 yo M with hx of CAD s/p CABG, ESRD on HD (TThS)c/b calciphylaxis, AFib on eloquis, insulin dependent type 2 DM, aortic and MV replacement, CVA with L sided weakness, HTN, HLD, chronic venous stasis presenting with cough, worsening lower extremity pain, redness. Regarding cough, he notes worsening cough x 1 week productive of brown sputum.  He denied SOB, has stable 2 pillow orthopnea, legs slightly more edematous than previously, does not recall how much volume was taken off at last HD. He reports not missing dialysis. Regarding legs, acute on chronic worsening with pus expressed this AM, has previously been treated with oral ABx. Denied fever, chills.  In the ED, -120/70s, HR of 80s, afebrile, initially hypoxic to 80s on RA, 90% on 4L, 100% on NRB. On exam, decreased breath sounds LLL, no crackles, bilateral 1+ non-pitting edema with erythema, dry skin, healing scab on L anterior shin and abraded skin on anterior L ankle.  Legs were symmetric, cool to touch, no clear fluid collections, non-purulent, w/ chronic venous stasis changes. Labs notable for no leukocytosis, troponin of 0.36, RVP negative.  CXR with LLL effusion, no focal consolidation. Treated for HCAP with vanc/zosyn/azithromycin and admitted to medicine. Patient received dialysis next morning. Pulmonology was consulted and performed a thoracentesis showing a transudative effusion.  Patient found to be lethargic and course c/b CO2 retention. Patient placed on BIPAP w/ improvement in mental status and BIPAP continued overnight. CO2 retention thought to be possibly from underlying chronic lung disease or inappropriate respiratory drive from his previous CVA. Pt was continued on BIPAP at night, likely will need BIPAP indefinitely. Antibiotics were discontinued given effusion was transudative. Patient remained afebrile with normal white count. Vascular evaluated patient and recommended ABG. Patient will need to follow up with vascular service as an outpatient. Patient continued with dialysis three times a week. Midodrine was added to keep patient’s blood pressure up during dialysis to remove more fluid. Pt underwent pigtail placement with thoracic surgery. 1/15: pt wants to go ahead with pigtail placement. thoracic sx notified.  1/16: per thoracic sx, attending has been notified, will speak to pt. pt  wants to clarify risks again with throacic before we set pigtail placement with IR   1/17: pigtail placement by IR scheduled for tomorrow. IR will call 4 hrs prior to stop heparin drip and will speak to family before procedure   1/18: per IR should go for pigtail tonight. They will call 2 hrs prior since PTT is low to stop heparin gtt, pt will speak to attending prior to procedure and give final consent   1/19: n/v throughout the day since the morning. had 1.5L CT output overnight, had 1L during the day, unclamped it for the night. had left sided shoulder pain, mid epigastric pain and pain over his abdomen intradialysis so HD was stopped, trops elevated to 3.47. cards called, questionable ekg changes, gave asprin 325 stat, since vitals stable did not activate cath team  1/20: ON: transferred to MICU after RRT for respiratory distress with tachypnea and hypoxia, on BiPAP, troponins elevated, no clear EKG change, no signs of sepsis. L chest tube still in place from 1/18  and draining on low suction. states he is having mild chest pain. AAOx3. ?segmental changes on echo. will f/up with formal TTE. c/w heparin gtt, asa/plavix. tylenol IV for pain at chest tube site. trend CE x once more.   CCU ADMIT  1/20: Admitted to CCU s/p PCI.  prox RCA s/p DESx1  dRPL s/p DESx1  1/20 Pneumothorax vs pneumomediastinum, follow up to resolution recommended   1/23: Plan for HD today given acidemia. Vanc/Zosyn resumed.   1/24: Ultrafiltration HD today.Removed the swan, leaving introducer in place for pressors. Per CTSx pleurex not an option given high risk of infection. If pleurodesis would need to be off Plavix at least 7 days.    1/25: Patient given 1L NS over 24 hours for hypotension. Patient net +1143.6 with total chest tube output drainage of 700 in 24hours. Plan to replace amount of volume patient will be losing through chest tube.   ON: 250 cc bolus, wants nepro  1/26: Patient with c/o vomitng post meals. States history of esophageal issues?strictures. Contacted Dr. Adonis Cristobal(ENT) for records. Startedon Protonix and reglan. Meals as tolerated. HD today- will monitor BPs.   1/27: Toletated dialysis yesterday with low dose maureen. BPs suffiecient off MAUREEN. Pending transfer to floors.   1/31: transferred to floors. underwent HD.   2/1: underwent HD again today.   2/2: underwent HD again today.   2/3: Chest tube still draining. No dialysis today. Patient having some eye pain. Ophtho called. Thoracic surgery signing off. Patient and family declining MBS for now  2/5: patient agreeable to MBS. will discuss with speech and swallow tmrw. hypoglycemic today 2/2 poor po intake. gave juice and 1 amp of d50 72 yo M with hx of CAD s/p CABG, ESRD on HD (TThS)c/b calciphylaxis, AFib on eloquis, insulin dependent type 2 DM, aortic and MV replacement, CVA with L sided weakness, HTN, HLD, chronic venous stasis presenting with cough, worsening lower extremity pain, redness. Regarding cough, he notes worsening cough x 1 week productive of brown sputum.  He denied SOB, has stable 2 pillow orthopnea, legs slightly more edematous than previously, does not recall how much volume was taken off at last HD. He reports not missing dialysis. Regarding legs, acute on chronic worsening with pus expressed this AM, has previously been treated with oral ABx. Denied fever, chills.  In the ED, -120/70s, HR of 80s, afebrile, initially hypoxic to 80s on RA, 90% on 4L, 100% on NRB. On exam, decreased breath sounds LLL, no crackles, bilateral 1+ non-pitting edema with erythema, dry skin, healing scab on L anterior shin and abraded skin on anterior L ankle.  Legs were symmetric, cool to touch, no clear fluid collections, non-purulent, w/ chronic venous stasis changes. Labs notable for no leukocytosis, troponin of 0.36, RVP negative.  CXR with LLL effusion, no focal consolidation. Treated for HCAP with vanc/zosyn/azithromycin and admitted to medicine. Patient received dialysis next morning. Pulmonology was consulted and performed a thoracentesis showing a transudative effusion.  Patient found to be lethargic and course c/b CO2 retention. Patient placed on BIPAP w/ improvement in mental status and BIPAP continued overnight. CO2 retention thought to be possibly from underlying chronic lung disease or inappropriate respiratory drive from his previous CVA. Pt was continued on BIPAP at night, likely will need BIPAP indefinitely. Antibiotics were discontinued given effusion was transudative. Patient remained afebrile with normal white count. Vascular evaluated patient and recommended ABG. Patient will need to follow up with vascular service as an outpatient. Patient continued with dialysis three times a week. Midodrine was added to keep patient’s blood pressure up during dialysis to remove more fluid. Pt underwent pigtail placement with thoracic surgery. Trops were elevated1/20: ON: transferred to MICU after RRT for respiratory distress with tachypnea and hypoxia, on BiPAP, troponins elevated, no clear EKG change, no signs of sepsis. L chest tube still in place from 1/18  and draining on low suction. states he is having mild chest pain. AAOx3. ?segmental changes on echo. will f/up with formal TTE. c/w heparin gtt, asa/plavix. tylenol IV for pain at chest tube site. trend CE x once more.   CCU ADMIT  1/20: Admitted to CCU s/p PCI.  prox RCA s/p DESx1  dRPL s/p DESx1  1/20 Pneumothorax vs pneumomediastinum, follow up to resolution recommended   1/23: Plan for HD today given acidemia. Vanc/Zosyn resumed.   1/24: Ultrafiltration HD today.Removed the swan, leaving introducer in place for pressors. Per CTSx pleurex not an option given high risk of infection. If pleurodesis would need to be off Plavix at least 7 days.    1/25: Patient given 1L NS over 24 hours for hypotension. Patient net +1143.6 with total chest tube output drainage of 700 in 24hours. Plan to replace amount of volume patient will be losing through chest tube.   ON: 250 cc bolus, wants nepro  1/26: Patient with c/o vomitng post meals. States history of esophageal issues?strictures. Contacted Dr. Adonis Cristobal(ENT) for records. Startedon Protonix and reglan. Meals as tolerated. HD today- will monitor BPs.   1/27: Toletated dialysis yesterday with low dose maureen. BPs suffiecient off MAUREEN. Pending transfer to floors.   1/31: transferred to floors. underwent HD.   2/1: underwent HD again today.   2/2: underwent HD again today.   2/3: Chest tube still draining. No dialysis today. Patient having some eye pain. Ophtho called. Thoracic surgery signing off. Patient and family declining MBS for now  2/5: patient agreeable to MBS. will discuss with speech and swallow tmrw. hypoglycemic today 2/2 poor po intake. gave juice and 1 amp of d50 70 yo M with hx of CAD s/p CABG, ESRD on HD (TThS)c/b calciphylaxis, AFib on eloquis, insulin dependent type 2 DM, aortic and MV replacement, CVA with L sided weakness, HTN, HLD, chronic venous stasis presenting with cough, worsening lower extremity pain, redness. Regarding cough, he notes worsening cough x 1 week productive of brown sputum.  He denied SOB, has stable 2 pillow orthopnea, legs slightly more edematous than previously, does not recall how much volume was taken off at last HD. He reports not missing dialysis. Regarding legs, acute on chronic worsening with pus expressed this AM, has previously been treated with oral ABx. Denied fever, chills.  In the ED, -120/70s, HR of 80s, afebrile, initially hypoxic to 80s on RA, 90% on 4L, 100% on NRB. On exam, decreased breath sounds LLL, no crackles, bilateral 1+ non-pitting edema with erythema, dry skin, healing scab on L anterior shin and abraded skin on anterior L ankle.  Legs were symmetric, cool to touch, no clear fluid collections, non-purulent, w/ chronic venous stasis changes. Labs notable for no leukocytosis, troponin of 0.36, RVP negative.  CXR with LLL effusion, no focal consolidation. Treated for HCAP with vanc/zosyn/azithromycin and admitted to medicine. Patient received dialysis next morning. Pulmonology was consulted and performed a thoracentesis showing a transudative effusion.  Patient found to be lethargic and course c/b CO2 retention. Patient placed on BIPAP w/ improvement in mental status and BIPAP continued overnight. CO2 retention thought to be possibly from underlying chronic lung disease or inappropriate respiratory drive from his previous CVA. Pt was continued on BIPAP at night, likely will need BIPAP indefinitely. Antibiotics were discontinued given effusion was transudative. Patient remained afebrile with normal white count. Vascular evaluated patient and recommended ABG. Patient will need to follow up with vascular service as an outpatient. Patient continued with dialysis three times a week. Midodrine was added to keep patient’s blood pressure up during dialysis to remove more fluid. Pt underwent pigtail placement with thoracic surgery. Trops were elevated, and patient was diagnosed with NSTEMI. He was transferred to the U and under went cath with two KATERIN placed. Patient was then transferred to medicine floors. He was evaluated by speech and swallow, who recommended patient remain NPO and recommended MBS vs FEEST. However patient and his daughter declined, and patient remained on po feeds despite high aspiration risk. Patient also remained full code. He continued to received HD almost daily. Pig tail catheter continued to drain. CT surgery continued to follow patient. On 2017, patient became hypotensive in the evening and lethargic. Could not receive HD. MICU was called, patient's BP improved and patient underwent HD. However, CXR revealed worsening pleural effusion and new left pneumothorax which resolved when his pigtail catheter was unclamped. Patient was also noted to be retaining CO2 on ABG, however he refused BIPAP. At 5 AM, patient underwent cardiopulmonary arrest and . 72 yo M with hx of CAD s/p CABG, ESRD on HD (TThS)c/b calciphylaxis, AFib on eloquis, insulin dependent type 2 DM, aortic and MV replacement, CVA with L sided weakness, HTN, HLD, chronic venous stasis presenting with cough, worsening lower extremity pain, redness. Regarding cough, he notes worsening cough x 1 week productive of brown sputum.  He denied SOB, has stable 2 pillow orthopnea, legs slightly more edematous than previously, does not recall how much volume was taken off at last HD. He reports not missing dialysis. Regarding legs, acute on chronic worsening with pus expressed this AM, has previously been treated with oral ABx. Denied fever, chills.  In the ED, -120/70s, HR of 80s, afebrile, initially hypoxic to 80s on RA, 90% on 4L, 100% on NRB. On exam, decreased breath sounds LLL, no crackles, bilateral 1+ non-pitting edema with erythema, dry skin, healing scab on L anterior shin and abraded skin on anterior L ankle.  Legs were symmetric, cool to touch, no clear fluid collections, non-purulent, w/ chronic venous stasis changes. Labs notable for no leukocytosis, troponin of 0.36, RVP negative.  CXR with LLL effusion, no focal consolidation. Treated for HCAP with vanc/zosyn/azithromycin and admitted to medicine. Patient received dialysis next morning. Pulmonology was consulted and performed a thoracentesis showing a transudative effusion.  Patient found to be lethargic and course c/b CO2 retention. Patient placed on BIPAP w/ improvement in mental status and BIPAP continued overnight. CO2 retention thought to be possibly from underlying chronic lung disease or inappropriate respiratory drive from his previous CVA. Pt was continued on BIPAP at night, likely will need BIPAP indefinitely. Antibiotics were discontinued given effusion was transudative. Patient remained afebrile with normal white count. Vascular evaluated patient and recommended ABG. Patient will need to follow up with vascular service as an outpatient. Patient continued with dialysis three times a week. Midodrine was added to keep patient’s blood pressure up during dialysis to remove more fluid. Pt underwent pigtail placement with thoracic surgery. Trops were elevated, and patient was diagnosed with NSTEMI. He was transferred to the U and under went cath with two KATERIN placed requriing pressors for RV failure/shock. Patient was then transferred to medicine floors. He was evaluated by speech and swallow, who recommended patient remain NPO and recommended MBS vs FEEST. However patient and his daughter declined, and patient remained on po feeds despite high aspiration risk and aware of intubation and possible risk of death. Patient also remained full code. He continued to received HD almost daily. Pig tail catheter continued to drain. CT surgery continued to follow patient and clamped tube as there was minimal output to see if fluid reaccumulates. He had a worsening metabolic acidosis despite HD. Tylenol and lactate levels were negative. pH remained around 7.2 with elevated Pc02. Palliative care consult was called and was pending given overall extensive hospital course with overall poor prognosis. On 2017, patient became hypotensive in the evening and lethargic but was A and O x3. Could not receive HD. MICU was called, patient's BP improved and patient underwent HD. However, CXR revealed worsening pleural effusion and new left pneumothorax which resolved when his pigtail catheter was unclamped. Patient was also noted to be retaining CO2 on ABG, however he refused BIPAP. At 5 AM was noted to have no pulses and underwent cardiopulmonary arrest and .    Extensive discussion (35min) with Health care proxy daughter Miranda, about death. All questions answered, provided emotional support.

## 2018-02-13 NOTE — ADVANCED PRACTICE NURSE CONSULT - RECOMMEDATIONS
Will recommend the followin. Bridge of nose: apply cavilon daily  2. right groin: daily pericare , apply Cavilon daily  3. B/l heels: apply cavilon daily, continue to off-load with z-angelique boots.  4. social work consult  5. continue with turning and positioning  6. nutrition support as pt condition allows  Tx plan discussed with RN- remain available as requested by staff. Area H Indication Text: Tumors in this location are included in Area H (eyelids, eyebrows, nose, lips, chin, ear, pre-auricular, post-auricular, temple, genitalia, hands, feet, ankles and areola).  Tissue conservation is critical in these anatomic locations.

## 2019-09-19 NOTE — PATIENT PROFILE ADULT. - PRESSURE ULCER(S)

## 2021-04-06 NOTE — DISCHARGE NOTE ADULT - INSTRUCTIONS
Daily Note     Today's date: 2021  Patient name: Karen Monreal  : 1965  MRN: [id number]  Referring provider: Luz Baer MD  Dx:   Encounter Diagnosis     ICD-10-CM    1  Closed nondisplaced oblique fracture of shaft of right fibula with routine healing, subsequent encounter  S82 434D    2  Closed extra-articular fracture of distal end of right tibia with routine healing, subsequent encounter  S82 301D                 Assessment  Assessment details: Karen Monreal is a 60 y o  male who presents to outpatient PT with a  Closed nondisplaced oblique fracture of shaft of right fibula with routine healing, subsequent encounter  (primary encounter diagnosis)  Closed extra-articular fracture of distal end of right tibia with routine healing, subsequent encounter  No further referral appears necessary at this time based upon examination results  Pt presents with decreased strength, ROM, balance, functional activity tolerance, and pain with movement in his right ankle,  which is  limiting his ability to perform the aforementioned functional activities  Etiologic factors include repetitive poor body mechanics  Prognosis is good given HEP compliance and PT 2-3/wk  Please contact me if you have any questions or recommendations  Thank you for the opportunity to share in  [facility]  RA 3/9     Karen Monreal has demonstrated decreased pain, increased strength, increased range of motion, and increased activity tolerance since starting physical therapy services  He reports  an overall improvement of 65% thus far  He continues to present with pain, decreased strength, decreased range of motion, and decreased activity tolerance and would benefit from additional skilled physical therapy interventions to address impairments and maximize function  RA   Karen Monreal reports 70% improvement since beginning skilled PT services   He has demonstrated improved ROM, strength and decreased pain since starting his plan of care  He continues with strength deficits in ankle eversion/inversion and limitations in ambulation on uneven surfaces and ankle stiffness with higher level activities  Additional OP PT services are indicated to improve ankle strength and dynamic stability to maximize the patient's function  Impairments: abnormal coordination, abnormal gait, abnormal muscle firing, abnormal muscle tone, abnormal or restricted ROM, activity intolerance, impaired balance, impaired physical strength, lacks appropriate home exercise program, pain with function, safety issue, weight-bearing intolerance, poor posture  and poor body mechanics  Functional limitations: Difficulty with ambulation, ADL's, IADL's, Stair negotiation  Understanding of Dx/Px/POC: good   Prognosis: good    Goals  STG to be achieved in 4 weeks     1  Pt will reduce subjective pain rating by at least 50 percent the help facilitate return to PLOF  Met   2  Pt will improve right ankle ROM by at least 10 degrees to help promote improved functional activity tolerance   Met   3  Pt will improve right ankle MMT scores by at least 1/2 grade to promote improved functional activity tolerance   Met       LTG to be achieved in 6-8 weeks  1  Pt will be complaint with HEP   Met   2  Pt will improve Right ankle ROM to [facility], to help facilitate independence with ADL's, IADL's, and functional activities   Met   3  Pt will improve Right ankle Strength to [facility] to help facilitate independence with ADL's, IADL's, and functional activities  Progressing     4  Pt will have no limitations with ambulation to help facilitate independence at home and in the community  Progressing   5   Pt will have no limitations with stair negotiation to help facilitate independence at home and in the community   Progressing         Plan  Patient would benefit from: skilled physical therapy  Planned therapy interventions: ADL training, balance, balance/weight bearing training, flexibility, functional ROM exercises, gait training, graded activity, graded exercise, joint mobilization, manual therapy, neuromuscular re-education, patient education, postural training, strengthening, therapeutic activities, therapeutic exercise, stretching, therapeutic training and home exercise program  Frequency: 2x week  Duration in weeks: 12  Plan of Care beginning date: 2021  Plan of Care expiration date: 2021  Treatment plan discussed with: patient, PTA and referring physician        Subjective Evaluation    History of Present Illness  Mechanism of injury: The patient is a 60year old male who presents to outpatient PT with a chief complaint of right ankle pain after fracture of distal right tibia  He recently completed PT 2 months ago, of which he reports helped with the strength and ROM in his right ankle  Reports deficits with walking up and down stairs, and ambulating on uneven surfaces  RA 3/9   The patient reports an improvement of 65 percent since beginning skilled PT  Reports right ankle is feeling good over all  Largest deficit is descending stairs  RA   Timbo Figueredo reports 70% improvement since beginning his PT plan of care  Notes improvements in range of motion and strength of the ankle, however feels unsure of his ankle when on uneven surfaces  States that he was fishing this weekend and was standing on rocks and felt that he was unsure of how his ankle would hold up  [place] pain/stiffness in the ankle the following two days         Pain     RA 3/9  RA 4  No pain reported  Current pain ratin    0 0  At best pain ratin    0 0  At worst pain ratin    0 0     Treatments  Previous treatment: physical therapy  Current treatment: physical therapy  Patient Goals  Patient goals for therapy: increased strength, decreased pain and increased motion          Objective     Active Range of Motion     Right Ankle/Foot     RA 3/9   RA 4      Dorsiflexion (ke): 10 degrees    10  Deg 10 Deg  Plantar flexion: 45 degrees    55  Deg  55 Deg  Inversion: 20 degrees     35  Deg   35 Deg  Eversion: 10 degrees     15  Deg   20 Deg    Strength/Myotome Testing     Right Ankle/Foot   Dorsiflexion: 4     4+   5  Plantar flexion: 4    4+   5  Inversion: 4-     4   4+  Eversion: 4-     4   4+             Manuals 4/6 3/18 3/25 3/29 4/1   Stretching to right ankle  BE/RR  10 min MJC  10 min MJC  10 min MJC  10 min MJC  10 min                            Neuro Re-Ed        SLS balance on R LE  Static 30"x2     biodex foam  20"x2 Static R LE  30" x2 Static R LE  30" x2 Static R LE   30" x2  Static  R LE 30" x2   Tandem stance L8 30"x2 ea  L 10 30" x2 ea L9 30" x2 ea L9 30" x2 ea L8 30" x2 ea   Biodex  L1 x30 ea   DF/PF  INV/EV L1 x30 ea  DF/PF  INV,EV L1 x30 ea PF/DF  INV/EV L1 x30 ea PF/DF, INV/EV  L1 x30 ea  PF/DF, INV, EV   Bosu Lunges 2x10 with R LE  2x10 with R LE 2x10 with RLE 2x10 with RLE 2x10 R LE                           Ther Ex 4/6 3/18 3/25 3/29 4/1   Treadmill  2 5 mph   10 min 2 0 mph  x10 min 2 0 mph  x10 min 2 0 mph x10 min 2 0 mph x10 min   Ankle TB 4 way  Black x20 ea    Black x20 ea Black x20 ea   Self Gastroc  Prostretch  30"x3 Prostretch  30" x3  Prostretch  30" x3 Prostretch  30x3 Prostretch  30" x3   HR/TR        Step Up  3# "B" 3x10  3# "B" 2x10 ea 3# "B" 2x10 ea   3# "B" 2x10  3# "B" 2x10   Leg Press  110# 3x10  100# 3x10 110# 2x10 110# 2x10 110# 3x10   TR/HR Leg Press  90# 3x10 80# 3x10 85# 2x10 90# 2x10 90# 3x10   Standing Hip 3 way  3# 2x10 ea direction, on foam with 1 UE support 3# 2 x10 ea dir   On foam 3# 2x10 ea dir  On foam   1 UE support 3# 2x10 ea dir  On foam  1 UE support 3# 2x10 ea dir  On foam  1 UE support   Mini Squats         Step downs "B" 2x10 step down with L LE "B" x15 step   Down with L LE "B" 2x10   Down with LLE "B" 2x10   Down with LLE "B" 2x10  Down with LLE           Ther Activity                        Gait Training            Stairs up/down  4 steps reciprocally            Modalities resume pre-admission renal and diabetic diet

## 2022-06-09 NOTE — ED ADULT NURSE NOTE - PERIPHERAL VASCULAR WDL
Pulses equal bilaterally, no edema present. SSKI Counseling:  I discussed with the patient the risks of SSKI including but not limited to thyroid abnormalities, metallic taste, GI upset, fever, headache, acne, arthralgias, paraesthesias, lymphadenopathy, easy bleeding, arrhythmias, and allergic reaction.

## 2024-06-08 NOTE — SWALLOW BEDSIDE ASSESSMENT ADULT - SLP PATIENT PROFILE REVIEW
"[id number]    /59 (BP Location: Right arm)   Pulse 66   Temp 98.1  F (36.7  C) (Oral)   Resp 16   Ht 1.651 m (5' 5\")   Wt 62.2 kg (137 lb 3.2 oz)   SpO2 96%   BMI 22.83 kg/m      VSS on RA, Afebrile. Denies pain, SOB. PRN Ativan 1 mg x1 for nausea. D3 R-EPOCH; good blood returns via port. BM today; good UOP. Continue POC.     Goal Outcome Evaluation:      Plan of Care Reviewed With: patient    Overall Patient Progress: no changeOverall Patient Progress: no change           " yes